# Patient Record
Sex: MALE | URBAN - METROPOLITAN AREA
[De-identification: names, ages, dates, MRNs, and addresses within clinical notes are randomized per-mention and may not be internally consistent; named-entity substitution may affect disease eponyms.]

---

## 2023-02-02 ENCOUNTER — OFFICE VISIT (OUTPATIENT)
Dept: URBAN - METROPOLITAN AREA CLINIC 56 | Facility: LOCATION | Age: 68
End: 2023-02-02
Payer: MEDICARE

## 2023-02-02 DIAGNOSIS — H53.10 SUBJECTIVE VISUAL DISTURBANCE: Primary | ICD-10-CM

## 2023-02-02 DIAGNOSIS — H25.13 AGE-RELATED NUCLEAR CATARACT, BILATERAL: ICD-10-CM

## 2023-02-02 DIAGNOSIS — H31.011 MACULA SCARS OF POSTERIOR POLE (POST TRAUMATIC), RIGHT EYE: ICD-10-CM

## 2023-02-02 DIAGNOSIS — H02.834 DERMATOCHALASIS OF LEFT UPPER EYELID: ICD-10-CM

## 2023-02-02 DIAGNOSIS — H02.831 DERMATOCHALASIS OF RIGHT UPPER EYELID: ICD-10-CM

## 2023-02-02 DIAGNOSIS — H52.4 PRESBYOPIA: ICD-10-CM

## 2023-02-02 PROCEDURE — 92004 COMPRE OPH EXAM NEW PT 1/>: CPT | Performed by: STUDENT IN AN ORGANIZED HEALTH CARE EDUCATION/TRAINING PROGRAM

## 2023-02-02 PROCEDURE — 92134 CPTRZ OPH DX IMG PST SGM RTA: CPT | Performed by: STUDENT IN AN ORGANIZED HEALTH CARE EDUCATION/TRAINING PROGRAM

## 2023-02-02 ASSESSMENT — INTRAOCULAR PRESSURE
OD: 11
OS: 12

## 2023-02-02 ASSESSMENT — KERATOMETRY
OS: 42.77
OD: 43.49

## 2023-02-02 ASSESSMENT — VISUAL ACUITY
OD: 20/20
OS: 20/20

## 2023-02-02 NOTE — IMPRESSION/PLAN
Impression: Subjective visual disturbance: H53.10. Plan: pt states intermittent diplopia and dizziness, sometimes very brief as little as a minute before self-resolving. No ocular findings today, symptoms do not appear eye related.  Recommend cont care with neurology as scheduled (pt has appt later today), may consider consult with ENT to r/o inner ear cause

## 2023-02-02 NOTE — IMPRESSION/PLAN
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 Plan: non-central, not limiting vision. Monitor. Call with vision changes.

## 2023-02-02 NOTE — IMPRESSION/PLAN
Impression: Dermatochalasis of right upper eyelid: H02.831. Plan: not significant to patient. monitor.